# Patient Record
Sex: FEMALE | Race: WHITE | NOT HISPANIC OR LATINO | URBAN - METROPOLITAN AREA
[De-identification: names, ages, dates, MRNs, and addresses within clinical notes are randomized per-mention and may not be internally consistent; named-entity substitution may affect disease eponyms.]

---

## 2021-12-28 ENCOUNTER — OFFICE VISIT (OUTPATIENT)
Dept: FAMILY MEDICINE | Facility: CLINIC | Age: 46
End: 2021-12-28

## 2021-12-28 DIAGNOSIS — Z02.1 PRE-EMPLOYMENT DRUG SCREENING: Primary | ICD-10-CM

## 2021-12-28 PROCEDURE — 99207 PR NO CHARGE NURSE ONLY: CPT

## 2022-03-15 ENCOUNTER — TELEPHONE (OUTPATIENT)
Dept: FAMILY MEDICINE | Facility: CLINIC | Age: 47
End: 2022-03-15
Payer: COMMERCIAL

## 2022-03-15 RX ORDER — BUPROPION HYDROCHLORIDE 300 MG/1
300 TABLET ORAL DAILY
Refills: 1 | Status: CANCELLED | OUTPATIENT
Start: 2022-03-15

## 2022-03-15 RX ORDER — ESCITALOPRAM OXALATE 20 MG/1
1 TABLET ORAL DAILY
COMMUNITY
Start: 2021-12-08 | End: 2022-03-22

## 2022-03-15 RX ORDER — ESCITALOPRAM OXALATE 20 MG/1
20 TABLET ORAL DAILY
Qty: 30 TABLET | Refills: 1 | Status: CANCELLED | OUTPATIENT
Start: 2022-03-15

## 2022-03-15 RX ORDER — BUPROPION HYDROCHLORIDE 300 MG/1
1 TABLET ORAL DAILY
COMMUNITY
Start: 2021-12-08 | End: 2022-03-22

## 2022-03-15 NOTE — TELEPHONE ENCOUNTER
Patient has never been seen here in clinic. She needs an appointment before we can prescribed the medication.    Thanks,  Michelle Mcmanus DNP, NP-C 3/15/2022 4:48 PM

## 2022-03-15 NOTE — PROGRESS NOTES
Patient would like her med refilled.  Her Wellburin and Lexapro to be filled at the [facility] pharmacy.

## 2022-03-15 NOTE — TELEPHONE ENCOUNTER
Patient would like a refill of her wellbutrin and her lexapro for one month thru the [facility] pharmacy.

## 2022-03-16 NOTE — TELEPHONE ENCOUNTER
Called patient and LVM to call the clinic back for an appointment for med refills.     Halley Maciel, CMA

## 2022-03-22 ENCOUNTER — OFFICE VISIT (OUTPATIENT)
Dept: FAMILY MEDICINE | Facility: CLINIC | Age: 47
End: 2022-03-22
Payer: COMMERCIAL

## 2022-03-22 VITALS
HEIGHT: 66 IN | BODY MASS INDEX: 32.78 KG/M2 | SYSTOLIC BLOOD PRESSURE: 90 MMHG | OXYGEN SATURATION: 98 % | WEIGHT: 204 LBS | HEART RATE: 86 BPM | DIASTOLIC BLOOD PRESSURE: 60 MMHG

## 2022-03-22 DIAGNOSIS — F41.8 DEPRESSION WITH ANXIETY: Primary | ICD-10-CM

## 2022-03-22 PROCEDURE — 99213 OFFICE O/P EST LOW 20 MIN: CPT | Performed by: NURSE PRACTITIONER

## 2022-03-22 RX ORDER — CLONAZEPAM 0.5 MG/1
0.5 TABLET ORAL
COMMUNITY
Start: 2020-09-04

## 2022-03-22 RX ORDER — CLONAZEPAM 0.5 MG/1
0.5 TABLET ORAL 2 TIMES DAILY PRN
Status: CANCELLED | OUTPATIENT
Start: 2022-03-22

## 2022-03-22 RX ORDER — BUPROPION HYDROCHLORIDE 300 MG/1
300 TABLET ORAL EVERY MORNING
Qty: 90 TABLET | Refills: 1 | Status: SHIPPED | OUTPATIENT
Start: 2022-03-22 | End: 2022-07-26

## 2022-03-22 RX ORDER — BUPROPION HYDROCHLORIDE 300 MG/1
300 TABLET ORAL EVERY MORNING
Qty: 90 TABLET | Refills: 1 | Status: SHIPPED | OUTPATIENT
Start: 2022-03-22 | End: 2022-03-22

## 2022-03-22 RX ORDER — ESCITALOPRAM OXALATE 20 MG/1
20 TABLET ORAL DAILY
Qty: 90 TABLET | Refills: 1 | Status: SHIPPED | OUTPATIENT
Start: 2022-03-22 | End: 2022-03-22

## 2022-03-22 RX ORDER — ESCITALOPRAM OXALATE 20 MG/1
20 TABLET ORAL DAILY
Qty: 90 TABLET | Refills: 1 | Status: SHIPPED | OUTPATIENT
Start: 2022-03-22 | End: 2022-07-26

## 2022-03-22 ASSESSMENT — ANXIETY QUESTIONNAIRES
5. BEING SO RESTLESS THAT IT IS HARD TO SIT STILL: NOT AT ALL
6. BECOMING EASILY ANNOYED OR IRRITABLE: MORE THAN HALF THE DAYS
GAD7 TOTAL SCORE: 8
1. FEELING NERVOUS, ANXIOUS, OR ON EDGE: MORE THAN HALF THE DAYS
3. WORRYING TOO MUCH ABOUT DIFFERENT THINGS: SEVERAL DAYS
7. FEELING AFRAID AS IF SOMETHING AWFUL MIGHT HAPPEN: SEVERAL DAYS
2. NOT BEING ABLE TO STOP OR CONTROL WORRYING: SEVERAL DAYS

## 2022-03-22 ASSESSMENT — PATIENT HEALTH QUESTIONNAIRE - PHQ9
SUM OF ALL RESPONSES TO PHQ QUESTIONS 1-9: 9
5. POOR APPETITE OR OVEREATING: SEVERAL DAYS

## 2022-03-22 NOTE — PROGRESS NOTES
"  Assessment & Plan     Depression with anxiety  Stable on medications. Needing to change providers b/c her current one is out of network. Also, her medications are covered by [facility]. Wellbutrin and lexapro refilled. Side effects, risks and benefits of medication were discussed with patient. Discussed how and when to take medication. She also takes clonazepam PRN, but does not need a refill currently.    - Comprehensive metabolic panel; Future  - buPROPion (WELLBUTRIN XL) 300 MG 24 hr tablet; Take 1 tablet (300 mg) by mouth every morning  - escitalopram (LEXAPRO) 20 MG tablet; Take 1 tablet (20 mg) by mouth daily             BMI:   Estimated body mass index is 32.93 kg/m  as calculated from the following:    Height as of this encounter: 1.676 m (5' 6\").    Weight as of this encounter: 92.5 kg (204 lb).       Patient Instructions   Medications refilled for 6 months.     Return in 6 months for annual physical and labs.       Return in about 6 months (around 9/22/2022), or if symptoms worsen or fail to improve, for Routine preventive.    FRANDY Grossman [facility]    Subjective   Magalie is a 46 year old who presents for the following health issues     HPI     Medication Followup of Wellbutrin XL & Lesapro    Taking Medication as prescribed: yes    Side Effects:  Shakes    Medication Helping Symptoms:  yes     Additional provider notes: Hx depression and occasional anxiety. Stable on Wellbutrin and Lexapro with PRN use of clonazepam. Needing refill of wellbutrin and lexapro. Her PCP is now out of network.     Verified she is on wellbutrin and lexapro doses in [facility]. Last refill was 03/06/22 by Dr. Craig. She did not pick it up as it will be covered through [facility]. Last CMP done on 06/2021 - normal.     PHQ 3/22/2022   PHQ-9 Total Score 9   Q9: Thoughts of better off dead/self-harm past 2 weeks Not at all     ALICE-7 SCORE 3/22/2022   Total Score 8           Review of Systems   All other " "systems reviewed and are negative.           Objective    BP 90/60 (BP Location: Left arm, Patient Position: Sitting, Cuff Size: Adult Regular)   Pulse 86   Ht 1.676 m (5' 6\")   Wt 92.5 kg (204 lb)   SpO2 98%   BMI 32.93 kg/m    Body mass index is 32.93 kg/m .  Physical Exam  Vitals and nursing note reviewed.   Constitutional:       General: She is not in acute distress.     Appearance: Normal appearance. She is not ill-appearing or toxic-appearing.   Cardiovascular:      Rate and Rhythm: Normal rate and regular rhythm.      Pulses: Normal pulses.      Heart sounds: Normal heart sounds.   Pulmonary:      Effort: Pulmonary effort is normal.      Breath sounds: Normal breath sounds.   Skin:     General: Skin is warm and dry.   Neurological:      Mental Status: She is alert and oriented to person, place, and time.   Psychiatric:         Behavior: Behavior normal.                        " EMS

## 2022-03-23 ASSESSMENT — ANXIETY QUESTIONNAIRES: GAD7 TOTAL SCORE: 8

## 2022-06-02 ENCOUNTER — VIRTUAL VISIT (OUTPATIENT)
Dept: FAMILY MEDICINE | Facility: CLINIC | Age: 47
End: 2022-06-02
Payer: COMMERCIAL

## 2022-06-02 DIAGNOSIS — Z78.9 PARTICIPANT IN HEALTH AND WELLNESS PLAN: Primary | ICD-10-CM

## 2022-06-02 PROCEDURE — 99207 PR NO CHARGE LOS: CPT

## 2022-06-02 NOTE — PROGRESS NOTES
Discussion:    Biometric F/U:    201.6 lbs, BMI 33.0, Waist 38 inches, /76, , HDL 58, HDL Ratio 3.2, Non-, TRG 51, GLU 79    Discussed implications of larger waist circumference/ BMI & behaviors to help reduce risks & reduce body fat (fresh foods = produce, lean meats, healthy fats/ vs processed foods, limit flour & sugar intake, regular exercise, sleep, manage stress).     Lifestyle Habits:  Diet/ Eating Habits: quick snack foods which she stated are not the healthiest stuff, vegetables tend to go bad. Overall eat at home, eat out rarely. Protein - pork chop, premier protein shakes, cheese, cottage cheese, yogurt, almonds, peanuts, peanut butter. Encouraged more produce in diet, especially dark green vegetables & lower sugar fruit options. Suggested utilizing frozen vegetables for a longer shelf life.      Physical Activity: 30 minute nightly walk, encouraged her to add in strength & flexibility training    Sleep: 7 hrs/ night    Stress: 7/10, biggest source of stress is everything going on in the world (finances).      100 lbs lost this past 2 yrs, Goal 150 lbs, 203 lbs. Major depression & on med's, emotional eater (cholate or junk foods as comfort foods) & has struggled with losing brother & father. Has found it helps to keep her mind occupied (reading, vacations, walking nightly), when on vacation not even hungry as distracted by everything else.     Goals:  - Reduce refined carbohydrates & process foods   - Be mindful of sugar sources  - Strength train a couple days a week    Resources/ Referrals:   - [facility] [employer]  - [employer]    Blossom Yang, BS, [facility]

## 2022-07-26 ENCOUNTER — OFFICE VISIT (OUTPATIENT)
Dept: FAMILY MEDICINE | Facility: CLINIC | Age: 47
End: 2022-07-26
Payer: COMMERCIAL

## 2022-07-26 VITALS
BODY MASS INDEX: 34.06 KG/M2 | DIASTOLIC BLOOD PRESSURE: 62 MMHG | OXYGEN SATURATION: 100 % | SYSTOLIC BLOOD PRESSURE: 98 MMHG | WEIGHT: 211 LBS | HEART RATE: 78 BPM

## 2022-07-26 DIAGNOSIS — R25.1 TREMOR: ICD-10-CM

## 2022-07-26 DIAGNOSIS — F41.8 DEPRESSION WITH ANXIETY: Primary | ICD-10-CM

## 2022-07-26 LAB
ALBUMIN SERPL-MCNC: 3.5 G/DL (ref 3.4–5)
ALP SERPL-CCNC: 71 U/L (ref 40–150)
ALT SERPL W P-5'-P-CCNC: 21 U/L (ref 0–50)
ANION GAP SERPL CALCULATED.3IONS-SCNC: 4 MMOL/L (ref 3–14)
AST SERPL W P-5'-P-CCNC: 17 U/L (ref 0–45)
BILIRUB SERPL-MCNC: 0.2 MG/DL (ref 0.2–1.3)
BUN SERPL-MCNC: 15 MG/DL (ref 7–30)
CALCIUM SERPL-MCNC: 9.2 MG/DL (ref 8.5–10.1)
CHLORIDE BLD-SCNC: 106 MMOL/L (ref 94–109)
CO2 SERPL-SCNC: 27 MMOL/L (ref 20–32)
CREAT SERPL-MCNC: 0.7 MG/DL (ref 0.52–1.04)
ERYTHROCYTE [DISTWIDTH] IN BLOOD BY AUTOMATED COUNT: 14 % (ref 10–15)
GFR SERPL CREATININE-BSD FRML MDRD: >90 ML/MIN/1.73M2
GLUCOSE BLD-MCNC: 79 MG/DL (ref 70–99)
HCT VFR BLD AUTO: 37.2 % (ref 35–47)
HGB BLD-MCNC: 11.6 G/DL (ref 11.7–15.7)
MCH RBC QN AUTO: 26.8 PG (ref 26.5–33)
MCHC RBC AUTO-ENTMCNC: 31.2 G/DL (ref 31.5–36.5)
MCV RBC AUTO: 86 FL (ref 78–100)
PLATELET # BLD AUTO: 210 10E3/UL (ref 150–450)
POTASSIUM BLD-SCNC: 4.2 MMOL/L (ref 3.4–5.3)
PROT SERPL-MCNC: 7.1 G/DL (ref 6.8–8.8)
RBC # BLD AUTO: 4.33 10E6/UL (ref 3.8–5.2)
SODIUM SERPL-SCNC: 137 MMOL/L (ref 133–144)
WBC # BLD AUTO: 5.8 10E3/UL (ref 4–11)

## 2022-07-26 PROCEDURE — 96127 BRIEF EMOTIONAL/BEHAV ASSMT: CPT | Performed by: NURSE PRACTITIONER

## 2022-07-26 PROCEDURE — 80053 COMPREHEN METABOLIC PANEL: CPT | Performed by: NURSE PRACTITIONER

## 2022-07-26 PROCEDURE — 36415 COLL VENOUS BLD VENIPUNCTURE: CPT | Performed by: NURSE PRACTITIONER

## 2022-07-26 PROCEDURE — 99213 OFFICE O/P EST LOW 20 MIN: CPT | Performed by: NURSE PRACTITIONER

## 2022-07-26 PROCEDURE — 85027 COMPLETE CBC AUTOMATED: CPT | Performed by: NURSE PRACTITIONER

## 2022-07-26 RX ORDER — ESCITALOPRAM OXALATE 20 MG/1
20 TABLET ORAL DAILY
Qty: 90 TABLET | Refills: 1 | Status: SHIPPED | OUTPATIENT
Start: 2022-07-26

## 2022-07-26 RX ORDER — BUPROPION HYDROCHLORIDE 300 MG/1
300 TABLET ORAL EVERY MORNING
Qty: 90 TABLET | Refills: 1 | Status: SHIPPED | OUTPATIENT
Start: 2022-07-26

## 2022-07-26 ASSESSMENT — ENCOUNTER SYMPTOMS
TREMORS: 1
NERVOUS/ANXIOUS: 1

## 2022-07-26 ASSESSMENT — PATIENT HEALTH QUESTIONNAIRE - PHQ9: SUM OF ALL RESPONSES TO PHQ QUESTIONS 1-9: 15

## 2022-07-26 NOTE — COMMUNITY RESOURCES LIST (ENGLISH)
07/26/2022   [facility]  N/A  For questions about this resource list or additional care needs, please contact your primary care clinic or care manager.  Phone: [phone]   Email: N/A   Address: [address] [place] [address]   Hours: N/A        Hotlines and Helplines       Hotline - Crisis help  1  [facility] - Behavioral Health Services - Access Line Distance: 7.15 miles      COVID-19 Status: Phone/Virtual   [address] [place] [address]  Language: English, Ugandan  Hours: Mon - Fri 8:00 AM - 4:30 PM   Phone: (521) 686-7607 Website: https://www.UsingMiles.gov/250/Behavioral-Health-Services     2  [facility] Distance: 15.02 miles      COVID-19 Status: Phone/Virtual   [address] [place] [address]  Language: English  Hours: Mon - Fri 8:00 AM - 4:00 PM   Phone: (731) 753-3974 Email: [email or web address] Website: [email or web address]/          Mental Health       Individual counseling  3  [facility] Psych[facility] Distance: 5.88 miles      COVID-19 Status: Regular Operations, COVID-19 Status: Phone/Virtual   [address] [place] [address]  Language: English  Hours: Mon - Thu 8:00 AM - 4:30 PM , Fri 8:00 AM - 3:00 PM  Fees: Insurance, Self Pay   Phone: (611) 195-5404 Email: info@[employer] Website: https://www.[employer]/family-therapy-[facility].html     4  [facility] - Behavioral Health Services Distance: 7.15 miles      COVID-19 Status: Regular Operations, COVID-19 Status: Phone/Virtual   [address] [place] [address]  Language: English, Ug[place]an  Hours: Mon - Fri 8:00 AM - 4:30 PM  Fees: Insurance, Self Pay   Phone: (210) 233-9899 Website: https://www.UsingMiles.gov/250/Behavioral-Health-Services     Mental health support group  5  [facility] - Caregiver Support Groups Distance: 13.78 miles       COVID-19 Status: Service Unavailable   [address] [place] [address]  Language: English  Hours: Wed 1:00 PM - 3:00 PM  Fees: Insurance, Self Pay   Phone: (514) 995-3958 Email: familymeans@[place] Website: https://www.[place]/     6  [facility] Distance: 24.48 miles      COVID-19 Status: Phone/Virtual   [address] [place] [address] [place] [address]  Language: English  Hours: Mon - Thu 7:00 AM - 8:00 PM , Fri 7:00 AM - 5:00 PM  Fees: Insurance, Self Pay   Phone: (882) 138-8053 Email: contactus@[employer] Website: https://www.[employer]/our-locations/[place]/          Important Numbers & Websites       Emergency Services   911  [facility]   311  Poison Control   (591) 977-3600  Suicide Prevention Lifeline   (492) 131-2582 (TALK)  Child Abuse Hotline   (769) 572-2811 (4-A-Child)  Sexual Assault Hotline   (357) 180-1142 (HOPE)  National Runaway Safeline   (653) 677-7195 (RUNAWAY)  All-Options Talkline   (673) 889-1886  Substance Abuse Referral   (873) 126-6642 (HELP)

## 2022-07-26 NOTE — PROGRESS NOTES
"  Assessment & Plan     Depression with anxiety  Stable on medication. CMP released. Follow-up in 6 months.     - buPROPion (WELLBUTRIN XL) 300 MG 24 hr tablet; Take 1 tablet (300 mg) by mouth every morning  - escitalopram (LEXAPRO) 20 MG tablet; Take 1 tablet (20 mg) by mouth daily  - Comprehensive metabolic panel    Tremor  New/worsening. Hx anemia. Labs ordered. Follow-up as needed.     - CBC with platelets             BMI:   Estimated body mass index is 34.06 kg/m  as calculated from the following:    Height as of 3/22/22: 1.676 m (5' 6\").    Weight as of this encounter: 95.7 kg (211 lb).       Depression Screening Follow Up    PHQ 7/26/2022   PHQ-9 Total Score 15   Q9: Thoughts of better off dead/self-harm past 2 weeks Not at all         Follow Up Actions Taken  Crisis resource information provided in After Visit Summary     Patient Instructions   Medications refilled for 6 months.     Labs ordered. Will call with those results in 1-2 days.       Return in about 6 months (around 1/26/2023).    FRANDY Grossman [facility]    Pedrito Mejias is a 47 year old, presenting for the following health issues:  Recheck Medication (Patient is here today to discuss how she is doing on her Wellbutrin and Lexapro since starting medication. Patient stated that she feels like medications are working. )      HPI     Chief Complaint   Patient presents with     Recheck Medication     Patient is here today to discuss how she is doing on her Wellbutrin and Lexapro since starting medication. Patient stated that she feels like medications are working.      PHQ 3/22/2022 7/26/2022   PHQ-9 Total Score 9 15   Q9: Thoughts of better off dead/self-harm past 2 weeks Not at all Not at all     ALICE-7 SCORE 3/22/2022   Total Score 8       Additional provider notes: Patient presents to clinic today for medication follow-up. She currently is taking Wellbutrin and Lexapro for depression and anxiety. " She has more stress at home and work. States it isn't constant. Has supportive boss. Since last visit, work had a massive change which increased work load for everyone.     Recent weight gain: emotional eater. States she is working on this.     Tremors: slight hand shaking in the past couple months. Hx of anemia.     Allergies   Allergen Reactions     Penicillins Hives     Morphine Nausea       Current Outpatient Medications   Medication     buPROPion (WELLBUTRIN XL) 300 MG 24 hr tablet     escitalopram (LEXAPRO) 20 MG tablet     clonazePAM (KLONOPIN) 0.5 MG tablet     No current facility-administered medications for this visit.       Past Medical History:   Diagnosis Date     Depressive disorder           Review of Systems   Neurological: Positive for tremors (hands).   Psychiatric/Behavioral: Positive for mood changes. Negative for self-injury and suicidal ideas. The patient is nervous/anxious.    All other systems reviewed and are negative.           Objective    BP 98/62 (BP Location: Left arm, Patient Position: Sitting, Cuff Size: Adult Large)   Pulse 78   Wt 95.7 kg (211 lb)   SpO2 100%   BMI 34.06 kg/m    Body mass index is 34.06 kg/m .  Physical Exam  Vitals reviewed.   Constitutional:       General: She is not in acute distress.     Appearance: Normal appearance. She is not ill-appearing or toxic-appearing.   Cardiovascular:      Rate and Rhythm: Normal rate and regular rhythm.      Pulses: Normal pulses.      Heart sounds: Normal heart sounds.   Pulmonary:      Effort: Pulmonary effort is normal.      Breath sounds: Normal breath sounds.   Musculoskeletal:      Comments: Normal  strength, pulses, sensations and color in hands   Skin:     General: Skin is warm and dry.   Neurological:      Mental Status: She is alert and oriented to person, place, and time.   Psychiatric:         Behavior: Behavior normal.                            .  ..

## 2022-08-02 ENCOUNTER — TELEPHONE (OUTPATIENT)
Dept: FAMILY MEDICINE | Facility: CLINIC | Age: 47
End: 2022-08-02

## 2022-08-02 DIAGNOSIS — D64.9 LOW HEMOGLOBIN: Primary | ICD-10-CM

## 2022-08-02 RX ORDER — MULTIVIT WITH MINERALS/LUTEIN
250-500 TABLET ORAL DAILY
COMMUNITY
Start: 2022-08-02

## 2022-08-02 RX ORDER — FERROUS SULFATE 325(65) MG
325 TABLET ORAL
COMMUNITY
Start: 2022-08-02

## 2022-08-02 NOTE — TELEPHONE ENCOUNTER
Discussed lab results with patient. Recommended she start OTC iron and vitamin C supplement and recheck labs in 1 month.     Michelle Mcmanus DNP, NP-C

## 2022-08-10 ENCOUNTER — HOSPITAL ENCOUNTER (OUTPATIENT)
Dept: CT IMAGING | Facility: HOSPITAL | Age: 47
Discharge: HOME | End: 2022-08-10
Attending: NURSE PRACTITIONER | Admitting: NURSE PRACTITIONER
Payer: COMMERCIAL

## 2022-08-10 ENCOUNTER — OFFICE VISIT (OUTPATIENT)
Dept: FAMILY MEDICINE | Facility: CLINIC | Age: 47
End: 2022-08-10
Payer: COMMERCIAL

## 2022-08-10 VITALS
DIASTOLIC BLOOD PRESSURE: 62 MMHG | WEIGHT: 214 LBS | SYSTOLIC BLOOD PRESSURE: 100 MMHG | BODY MASS INDEX: 34.54 KG/M2 | TEMPERATURE: 98.3 F

## 2022-08-10 DIAGNOSIS — R31.0 GROSS HEMATURIA: ICD-10-CM

## 2022-08-10 DIAGNOSIS — N30.01 ACUTE CYSTITIS WITH HEMATURIA: Primary | ICD-10-CM

## 2022-08-10 LAB
ALBUMIN SERPL-MCNC: 3.6 G/DL (ref 3.4–5)
ALBUMIN UR-MCNC: 100 MG/DL
ALP SERPL-CCNC: 80 U/L (ref 40–150)
ALT SERPL W P-5'-P-CCNC: 21 U/L (ref 0–50)
ANION GAP SERPL CALCULATED.3IONS-SCNC: 7 MMOL/L (ref 3–14)
APPEARANCE UR: CLEAR
AST SERPL W P-5'-P-CCNC: 12 U/L (ref 0–45)
BILIRUB SERPL-MCNC: 0.2 MG/DL (ref 0.2–1.3)
BILIRUB UR QL STRIP: NEGATIVE
BUN SERPL-MCNC: 13 MG/DL (ref 7–30)
CALCIUM SERPL-MCNC: 9 MG/DL (ref 8.5–10.1)
CHLORIDE BLD-SCNC: 107 MMOL/L (ref 94–109)
CO2 SERPL-SCNC: 29 MMOL/L (ref 20–32)
COLOR UR AUTO: ABNORMAL
CREAT SERPL-MCNC: 0.74 MG/DL (ref 0.52–1.04)
ERYTHROCYTE [DISTWIDTH] IN BLOOD BY AUTOMATED COUNT: 14.3 % (ref 10–15)
GFR SERPL CREATININE-BSD FRML MDRD: >90 ML/MIN/1.73M2
GLUCOSE BLD-MCNC: 163 MG/DL (ref 70–99)
GLUCOSE UR STRIP-MCNC: NEGATIVE MG/DL
HCT VFR BLD AUTO: 38.1 % (ref 35–47)
HGB BLD-MCNC: 11.8 G/DL (ref 11.7–15.7)
HGB UR QL STRIP: ABNORMAL
KETONES UR STRIP-MCNC: NEGATIVE MG/DL
LEUKOCYTE ESTERASE UR QL STRIP: ABNORMAL
MCH RBC QN AUTO: 26.9 PG (ref 26.5–33)
MCHC RBC AUTO-ENTMCNC: 31 G/DL (ref 31.5–36.5)
MCV RBC AUTO: 87 FL (ref 78–100)
NITRATE UR QL: NEGATIVE
PH UR STRIP: 6.5 [PH] (ref 5–7)
PLATELET # BLD AUTO: 305 10E3/UL (ref 150–450)
POTASSIUM BLD-SCNC: 4 MMOL/L (ref 3.4–5.3)
PROT SERPL-MCNC: 7 G/DL (ref 6.8–8.8)
RBC # BLD AUTO: 4.39 10E6/UL (ref 3.8–5.2)
SODIUM SERPL-SCNC: 143 MMOL/L (ref 133–144)
SP GR UR STRIP: <1.005 (ref 1–1.03)
UROBILINOGEN UR STRIP-ACNC: 0.2 E.U./DL
WBC # BLD AUTO: 10.2 10E3/UL (ref 4–11)

## 2022-08-10 PROCEDURE — 36415 COLL VENOUS BLD VENIPUNCTURE: CPT | Performed by: NURSE PRACTITIONER

## 2022-08-10 PROCEDURE — 87086 URINE CULTURE/COLONY COUNT: CPT | Performed by: NURSE PRACTITIONER

## 2022-08-10 PROCEDURE — 74178 CT ABD&PLV WO CNTR FLWD CNTR: CPT

## 2022-08-10 PROCEDURE — 99214 OFFICE O/P EST MOD 30 MIN: CPT | Performed by: NURSE PRACTITIONER

## 2022-08-10 PROCEDURE — 81003 URINALYSIS AUTO W/O SCOPE: CPT

## 2022-08-10 PROCEDURE — 255N000002 HC RX 255 OP 636: Performed by: NURSE PRACTITIONER

## 2022-08-10 PROCEDURE — 87186 SC STD MICRODIL/AGAR DIL: CPT | Performed by: NURSE PRACTITIONER

## 2022-08-10 PROCEDURE — 85027 COMPLETE CBC AUTOMATED: CPT | Performed by: NURSE PRACTITIONER

## 2022-08-10 PROCEDURE — 80053 COMPREHEN METABOLIC PANEL: CPT | Performed by: NURSE PRACTITIONER

## 2022-08-10 RX ORDER — SULFAMETHOXAZOLE/TRIMETHOPRIM 800-160 MG
1 TABLET ORAL 2 TIMES DAILY
Qty: 6 TABLET | Refills: 0 | Status: SHIPPED | OUTPATIENT
Start: 2022-08-10 | End: 2022-08-13

## 2022-08-10 RX ADMIN — IOHEXOL 100 ML: 350 INJECTION, SOLUTION INTRAVENOUS at 16:46

## 2022-08-10 NOTE — PROGRESS NOTES
"  Assessment & Plan     Acute cystitis with hematuria  Urine analysis shows small leukocytes and large amount of blood. Unable to check Urine Micro so will treat empirically for UTI. Will test a urine culture and because the blood is so significant will check a CT Scan of kidney  - sulfamethoxazole-trimethoprim (BACTRIM DS) 800-160 MG tablet; Take 1 tablet by mouth 2 times daily for 3 days    Gross hematuria  CT scan was negative for cause of gross hematuria.   - UA without Microscopic; Future  - UA without Microscopic  - Urine Culture Aerobic Bacterial  - Comprehensive metabolic panel  - CBC with platelets  - CT Urogram wo & w Contrast; Future  - sulfamethoxazole-trimethoprim (BACTRIM DS) 800-160 MG tablet; Take 1 tablet by mouth 2 times daily for 3 days    BMI:   Estimated body mass index is 34.54 kg/m  as calculated from the following:    Height as of 3/22/22: 1.676 m (5' 6\").    Weight as of this encounter: 97.1 kg (214 lb).     Return in about 1 week (around 8/17/2022), or if symptoms worsen or fail to improve, for Follow up.    FRANDY Casillas [facility]    Pedrito Mejias is a 47 year old presenting for the following health issues:  Kidney Problem      HPI     Genitourinary - Female  Onset/Duration: 8/10/22  Description:   Painful urination (Dysuria): YES- But not until the end of the stream           Frequency: YES  Blood in urine (Hematuria): YES  Delay in urine (Hesitency): No  Intensity: moderate  Progression of Symptoms:  Just started the morning  Accompanying Signs & Symptoms:  Fever/chills: No  Flank pain: No  Nausea and vomiting: No  Vaginal symptoms: none  Abdominal/Pelvic Pain: No  History:   History of frequent UTI s: No    Symptoms started this morning with avelina red blood in urine. Has a little bladder pressure, urinary frequency but no other symptoms no history of blood in urine. No recent history of UTI, denies fevers, chills, body aches, flank " pain, nausea, vomiting. No history of cancer.     Review of Systems   Constitutional, HEENT, cardiovascular, pulmonary, gi and gu systems are negative, except as otherwise noted.      Objective    /62 (BP Location: Left arm, Patient Position: Sitting, Cuff Size: Adult Large)   Temp 98.3  F (36.8  C) (Tympanic)   Wt 97.1 kg (214 lb)   BMI 34.54 kg/m    Body mass index is 34.54 kg/m .  Physical Exam   GENERAL: healthy, alert and no distress  ABDOMEN: soft, nontender, no hepatosplenomegaly, no masses and bowel sounds normal  NEURO: Normal strength and tone, mentation intact and speech normal  PSYCH: mentation appears normal, affect normal/bright              .  ..

## 2022-08-10 NOTE — PATIENT INSTRUCTIONS
Imaging centers scheduling numbers; [facility]; [phone] option 1 or [facility] [phone]

## 2022-08-11 DIAGNOSIS — R31.0 GROSS HEMATURIA: Primary | ICD-10-CM

## 2022-08-12 LAB — BACTERIA UR CULT: ABNORMAL

## 2023-02-12 ENCOUNTER — HEALTH MAINTENANCE LETTER (OUTPATIENT)
Age: 48
End: 2023-02-12

## 2024-03-09 ENCOUNTER — HEALTH MAINTENANCE LETTER (OUTPATIENT)
Age: 49
End: 2024-03-09

## 2025-02-16 ENCOUNTER — HEALTH MAINTENANCE LETTER (OUTPATIENT)
Age: 50
End: 2025-02-16

## 2025-03-16 ENCOUNTER — HEALTH MAINTENANCE LETTER (OUTPATIENT)
Age: 50
End: 2025-03-16